# Patient Record
Sex: FEMALE | Race: WHITE | Employment: FULL TIME | URBAN - METROPOLITAN AREA
[De-identification: names, ages, dates, MRNs, and addresses within clinical notes are randomized per-mention and may not be internally consistent; named-entity substitution may affect disease eponyms.]

---

## 2017-06-21 RX ORDER — ROSUVASTATIN CALCIUM 20 MG/1
TABLET, COATED ORAL
Qty: 30 TABLET | Refills: 5 | Status: SHIPPED | OUTPATIENT
Start: 2017-06-21 | End: 2017-10-27

## 2017-09-25 ENCOUNTER — OFFICE VISIT (OUTPATIENT)
Dept: FAMILY MEDICINE CLINIC | Age: 47
End: 2017-09-25

## 2017-09-25 VITALS
OXYGEN SATURATION: 99 % | SYSTOLIC BLOOD PRESSURE: 142 MMHG | DIASTOLIC BLOOD PRESSURE: 74 MMHG | WEIGHT: 157 LBS | BODY MASS INDEX: 25.84 KG/M2 | HEART RATE: 90 BPM

## 2017-09-25 DIAGNOSIS — F32.1 MODERATE SINGLE CURRENT EPISODE OF MAJOR DEPRESSIVE DISORDER (HCC): Primary | ICD-10-CM

## 2017-09-25 PROCEDURE — 99214 OFFICE O/P EST MOD 30 MIN: CPT | Performed by: FAMILY MEDICINE

## 2017-09-25 RX ORDER — ESCITALOPRAM OXALATE 10 MG/1
TABLET ORAL
Qty: 30 TABLET | Refills: 5 | Status: SHIPPED | OUTPATIENT
Start: 2017-09-25 | End: 2018-03-26 | Stop reason: SDUPTHER

## 2017-09-26 ENCOUNTER — TELEPHONE (OUTPATIENT)
Dept: FAMILY MEDICINE CLINIC | Age: 47
End: 2017-09-26

## 2017-10-23 ENCOUNTER — OFFICE VISIT (OUTPATIENT)
Dept: FAMILY MEDICINE CLINIC | Age: 47
End: 2017-10-23

## 2017-10-23 VITALS
BODY MASS INDEX: 25.19 KG/M2 | WEIGHT: 153 LBS | SYSTOLIC BLOOD PRESSURE: 118 MMHG | HEART RATE: 77 BPM | DIASTOLIC BLOOD PRESSURE: 70 MMHG | OXYGEN SATURATION: 98 %

## 2017-10-23 DIAGNOSIS — E78.2 MIXED HYPERLIPIDEMIA: ICD-10-CM

## 2017-10-23 DIAGNOSIS — F32.A MODERATE DEPRESSIVE DISORDER: Primary | ICD-10-CM

## 2017-10-23 PROCEDURE — 99213 OFFICE O/P EST LOW 20 MIN: CPT | Performed by: FAMILY MEDICINE

## 2017-10-23 ASSESSMENT — ENCOUNTER SYMPTOMS
CHEST TIGHTNESS: 0
SHORTNESS OF BREATH: 0

## 2017-10-26 ENCOUNTER — NURSE ONLY (OUTPATIENT)
Dept: FAMILY MEDICINE CLINIC | Age: 47
End: 2017-10-26

## 2017-10-26 DIAGNOSIS — E78.2 MIXED HYPERLIPIDEMIA: Primary | ICD-10-CM

## 2017-10-26 DIAGNOSIS — F32.A MODERATE DEPRESSIVE DISORDER: ICD-10-CM

## 2017-10-26 DIAGNOSIS — E78.2 MIXED HYPERLIPIDEMIA: ICD-10-CM

## 2017-10-26 LAB
A/G RATIO: 2.1 (ref 1.1–2.2)
ALBUMIN SERPL-MCNC: 4.1 G/DL (ref 3.4–5)
ALP BLD-CCNC: 35 U/L (ref 40–129)
ALT SERPL-CCNC: 16 U/L (ref 10–40)
ANION GAP SERPL CALCULATED.3IONS-SCNC: 9 MMOL/L (ref 3–16)
AST SERPL-CCNC: 13 U/L (ref 15–37)
BILIRUB SERPL-MCNC: 0.5 MG/DL (ref 0–1)
BUN BLDV-MCNC: 12 MG/DL (ref 7–20)
CALCIUM SERPL-MCNC: 8.8 MG/DL (ref 8.3–10.6)
CHLORIDE BLD-SCNC: 100 MMOL/L (ref 99–110)
CHOLESTEROL, TOTAL: 291 MG/DL (ref 0–199)
CO2: 27 MMOL/L (ref 21–32)
CREAT SERPL-MCNC: 0.5 MG/DL (ref 0.6–1.1)
GFR AFRICAN AMERICAN: >60
GFR NON-AFRICAN AMERICAN: >60
GLOBULIN: 2 G/DL
GLUCOSE BLD-MCNC: 94 MG/DL (ref 70–99)
HDLC SERPL-MCNC: 65 MG/DL (ref 40–60)
LDL CHOLESTEROL CALCULATED: 208 MG/DL
POTASSIUM SERPL-SCNC: 4.3 MMOL/L (ref 3.5–5.1)
SODIUM BLD-SCNC: 136 MMOL/L (ref 136–145)
TOTAL PROTEIN: 6.1 G/DL (ref 6.4–8.2)
TRIGL SERPL-MCNC: 91 MG/DL (ref 0–150)
VLDLC SERPL CALC-MCNC: 18 MG/DL

## 2017-10-26 PROCEDURE — 36415 COLL VENOUS BLD VENIPUNCTURE: CPT | Performed by: FAMILY MEDICINE

## 2017-10-26 NOTE — PROGRESS NOTES
Patient came into the office per physician's request for the following blood test(s): lipid panel, comprehensive metabolic panel.       Blood drawn in office by     # of tubes sent:1 SST

## 2017-10-27 ENCOUNTER — TELEPHONE (OUTPATIENT)
Dept: FAMILY MEDICINE CLINIC | Age: 47
End: 2017-10-27

## 2017-10-27 DIAGNOSIS — R74.01 ALT (SGPT) LEVEL RAISED: Primary | ICD-10-CM

## 2017-10-27 RX ORDER — ROSUVASTATIN CALCIUM 40 MG/1
40 TABLET, COATED ORAL DAILY
Qty: 30 TABLET | Refills: 5 | Status: SHIPPED | OUTPATIENT
Start: 2017-10-27 | End: 2018-06-03 | Stop reason: SDUPTHER

## 2018-03-26 DIAGNOSIS — F32.1 MODERATE SINGLE CURRENT EPISODE OF MAJOR DEPRESSIVE DISORDER (HCC): ICD-10-CM

## 2018-03-26 RX ORDER — ESCITALOPRAM OXALATE 10 MG/1
TABLET ORAL
Qty: 28 TABLET | Refills: 4 | Status: SHIPPED | OUTPATIENT
Start: 2018-03-26 | End: 2018-09-11 | Stop reason: SDUPTHER

## 2018-05-25 DIAGNOSIS — J45.20 MILD INTERMITTENT ASTHMA WITHOUT COMPLICATION: ICD-10-CM

## 2018-06-04 RX ORDER — ROSUVASTATIN CALCIUM 40 MG/1
TABLET, COATED ORAL
Qty: 30 TABLET | Refills: 5 | Status: SHIPPED | OUTPATIENT
Start: 2018-06-04 | End: 2020-10-08

## 2018-08-13 ENCOUNTER — TELEPHONE (OUTPATIENT)
Dept: FAMILY MEDICINE CLINIC | Age: 48
End: 2018-08-13

## 2018-08-13 DIAGNOSIS — M79.645 THUMB PAIN, LEFT: Primary | ICD-10-CM

## 2018-09-06 ENCOUNTER — OFFICE VISIT (OUTPATIENT)
Dept: ORTHOPEDIC SURGERY | Age: 48
End: 2018-09-06

## 2018-09-06 VITALS — WEIGHT: 163.14 LBS | BODY MASS INDEX: 27.18 KG/M2 | HEIGHT: 65 IN

## 2018-09-06 DIAGNOSIS — M79.642 HAND PAIN, LEFT: Primary | ICD-10-CM

## 2018-09-06 DIAGNOSIS — R20.0 NUMBNESS IN BOTH HANDS: ICD-10-CM

## 2018-09-06 PROBLEM — M18.0 ARTHRITIS OF CARPOMETACARPAL (CMC) JOINT OF BOTH THUMBS: Status: ACTIVE | Noted: 2018-09-06

## 2018-09-06 PROCEDURE — L3918 METACARP FX ORTHOSIS PRE OTS: HCPCS | Performed by: ORTHOPAEDIC SURGERY

## 2018-09-06 PROCEDURE — 99243 OFF/OP CNSLTJ NEW/EST LOW 30: CPT | Performed by: ORTHOPAEDIC SURGERY

## 2018-09-06 RX ORDER — ESCITALOPRAM OXALATE 5 MG/1
5 TABLET ORAL DAILY
COMMUNITY
End: 2018-09-12

## 2018-09-06 NOTE — PROGRESS NOTES
HISTORY OF PRESENT ILLNESS:  The patient is a 62-year-old somewhat ambidextrous  who presents today for a new hand surgery specialty consultation at the request of Dr. Humphrey Doshi regarding both hands. Over the past couple of years she has had progressively worsening pain along the base of the left thumb which is exacerbated with firm gripping and forceful use when she is working in [employer]. However, over the last 6 months she started to have loss of strength with some numbness and tingling and some cramping feeling in the early mornings and at nighttime. She denies any neck pain. PAST MEDICAL HISTORY: Patient's medications, allergies, past medical, surgical, social and family histories were reviewed and updated as appropriate. ROS: Pertinent items are noted in HPI. Review of systems reviewed from patient history form dated on 9/6/18 and available in the patient's chart under the media tab. Denies fever, chills, confusion, bowel/bladder active change. PHYSICAL EXAMINATION: Examination reveals a pleasant individual in no acute distress. There appears to be satisfactory pain-free range of motion, strength, and stability of the cervical spine, shoulders, and elbows. Skin is intact without lymphadenopathy, discoloration, or abnormal temperature. There is intact, symmetric circulation in both upper extremities. Wrist, hand, and digital range of motion is satisfactory bilaterally. There is tenderness mostly along the base of the left thumb and a positive CMC grind consistent with left greater than right thumb CMC arthritis. Provocative testing for bilateral carpal tunnel syndrome suggests reproduction of symptoms with direct compression, Phalen's, and Tinel's. There is no sign of circulatory dysfunction or atrophy. DIAGNOSTIC TESTING: Three views of the left thumb reveal grade 2 degenerative change at the [facility] without obvious acute fracture.       IMPRESSION AND PLAN: I

## 2018-09-11 DIAGNOSIS — F32.1 MODERATE SINGLE CURRENT EPISODE OF MAJOR DEPRESSIVE DISORDER (HCC): ICD-10-CM

## 2018-09-11 NOTE — TELEPHONE ENCOUNTER
Looks like when pt saw Dr Hu Carpenter recently, she said she was taking lexapro 5 mg qd, which is fine, but our chart says 10 mg. If she is on 5 mg, we can change rx to matt 5 1qd 30+0. (or maybe 5 mg entry was a mistake).

## 2018-09-12 RX ORDER — ESCITALOPRAM OXALATE 10 MG/1
TABLET ORAL
Qty: 30 TABLET | Refills: 0 | Status: SHIPPED | OUTPATIENT
Start: 2018-09-12 | End: 2018-10-22 | Stop reason: SDUPTHER

## 2018-10-04 ENCOUNTER — OFFICE VISIT (OUTPATIENT)
Dept: ORTHOPEDIC SURGERY | Age: 48
End: 2018-10-04
Payer: COMMERCIAL

## 2018-10-04 DIAGNOSIS — M18.0 ARTHRITIS OF CARPOMETACARPAL (CMC) JOINT OF BOTH THUMBS: Primary | ICD-10-CM

## 2018-10-04 DIAGNOSIS — R20.0 NUMBNESS IN BOTH HANDS: ICD-10-CM

## 2018-10-04 DIAGNOSIS — G56.01 CARPAL TUNNEL SYNDROME OF RIGHT WRIST: ICD-10-CM

## 2018-10-04 DIAGNOSIS — G56.03 BILATERAL CARPAL TUNNEL SYNDROME: Primary | ICD-10-CM

## 2018-10-04 PROCEDURE — L3908 WHO COCK-UP NONMOLDE PRE OTS: HCPCS | Performed by: ORTHOPAEDIC SURGERY

## 2018-10-04 PROCEDURE — 95910 NRV CNDJ TEST 7-8 STUDIES: CPT | Performed by: PHYSICAL MEDICINE & REHABILITATION

## 2018-10-04 PROCEDURE — 99213 OFFICE O/P EST LOW 20 MIN: CPT | Performed by: ORTHOPAEDIC SURGERY

## 2018-10-04 PROCEDURE — 95886 MUSC TEST DONE W/N TEST COMP: CPT | Performed by: PHYSICAL MEDICINE & REHABILITATION

## 2018-10-04 NOTE — PROGRESS NOTES
[address] MEDICINE      Patient: Roque Shane Age: 49 Years 5 Months  Sex: Female Date: 10/4/2018  YOB: 1970 Ref. Phys.: Dr Jabari Villanueva  Notes:  r/o bilateral CTS      Sensory NCS      Nerve / Sites Peak PeakAmp Dist Pedro    ms µV cm m/s   L MEDIAN - D2 ULNAR D5   1. Median Wrist 3.10 33.0 14 56.0   2. Ulnar Wrist 3.25 11.8 14 58.3   R MEDIAN - D2 ULNAR D5   1. Median Wrist 3.70 24.6 14 45.9   2. Ulnar Wrist 3.15 21.9 14 57.1   L MEDIAN - RADIAL THUMB   1. Median Wrist       2. Radial Wrist 2.20 20.2 10 64.5   R MEDIAN - RADIAL THUMB   1. Median Wrist       2. Radial Wrist 2.35 25.0 10 52.6       Motor NCS      Nerve / Sites Lat Amp Amp Dist Pedro    ms mV % cm m/s   L MEDIAN - APB   1. Wrist 3.60 8.1 100 8    2. Elbow 7.45 8.5 105 23 59.7   R MEDIAN - APB   1. Wrist 3.70 6.4 100 8    2. Elbow 7.65 6.4 99.4 23 58.2   L ULNAR - ADM   1. Wrist 2.85 9.1 100 8    2. B. Elbow 5.55 9.8 108 19 70.4   3. A. Elbow 7.15 9.7 106 10 62.5   R ULNAR - ADM   1. Wrist 2.80 10.1 100 8    2. B. Elbow 5.50 10.0 98.5 20 74.1   3. A. Elbow 7.20 9.3 91.7 10 58.8       EMG Summary Table     Spontaneous MUAP Recruit. Ins. Act Fibs. PSW Fasics. H.F. Amp. Dur. Poly's. Pattern   L. FIRST D INTEROSS N None None None None N N N N   R. ABD POLL BREVIS N None None None None N N N N   R. BICEPS N None None None None N N N N   R. TRICEPS N None None None None N N N N   R. EXT CARPI R BREV N None None None None N N N N   R. EXT DIG COMM N None None None None N N N N   R. PRON TERES N None None None None N N N N   R. FIRST D INTEROSS N None None None None N N N N   L. BICEPS N None None None None N N N N   L. TRICEPS N None None None None N N N N   L. EXT CARPI R BREV N None None None None N N N N   L. EXT DIG COMM N None None None None N N N N   L.  PRON TERES N None None None None N N N N       Summary: Right median SNAP latency is mildy slowed across the wrist.  The remainder of the

## 2018-10-05 ENCOUNTER — HOSPITAL ENCOUNTER (OUTPATIENT)
Dept: OCCUPATIONAL THERAPY | Age: 48
Setting detail: THERAPIES SERIES
Discharge: HOME | End: 2018-10-05
Payer: COMMERCIAL

## 2018-10-05 PROCEDURE — G8984 CARRY CURRENT STATUS: HCPCS | Performed by: OCCUPATIONAL THERAPIST

## 2018-10-05 PROCEDURE — G0283 ELEC STIM OTHER THAN WOUND: HCPCS | Performed by: OCCUPATIONAL THERAPIST

## 2018-10-05 PROCEDURE — 97165 OT EVAL LOW COMPLEX 30 MIN: CPT | Performed by: OCCUPATIONAL THERAPIST

## 2018-10-05 PROCEDURE — L3913 HFO W/O JOINTS CF: HCPCS | Performed by: OCCUPATIONAL THERAPIST

## 2018-10-05 PROCEDURE — 97018 PARAFFIN BATH THERAPY: CPT | Performed by: OCCUPATIONAL THERAPIST

## 2018-10-05 PROCEDURE — 97110 THERAPEUTIC EXERCISES: CPT | Performed by: OCCUPATIONAL THERAPIST

## 2018-10-05 PROCEDURE — G8985 CARRY GOAL STATUS: HCPCS | Performed by: OCCUPATIONAL THERAPIST

## 2018-10-05 NOTE — FLOWSHEET NOTE
[facility], [address] [place] [address] Adrián  Phone: [phone]  Fax [phone]  Hand Therapy Daily Treatment Note  Date:  10/5/2018    Patient: Phyllis Jacinto   : 1970   MRN: [id number]  Referring Physician: Referring Practitioner: Dr. Adán Reyes Diagnosis Information:   Diagnosis: L thumb CMC OA, B CTS Treatment dx: B hand pain. M25.541 M25.542        Date of injury: progressive onset   Date of Surgery/Type of procedure: na                                     Insurance information:  anthem   Comorbidities Affecting Functional Performance:     []Anxiety (F41.9)/Depression (F32.9)   []Diabetes Type 1(E10.65) or 2 (E11.65)   []Rheumatoid Arthritis (M05.9)  []Fibromyalgia (M79.7)  []Neuropathy(G60.9)  [x]Osteoarthritis(M19.91)  []None   []Other:    G-code: OT G-codes  Functional Assessment Tool Used: quick dash   Score: 23%  Functional Limitation: Carrying, moving and handling objects  Carrying, Moving and Handling Objects Current Status (): At least 20 percent but less than 40 percent impaired, limited or restricted  Carrying, Moving and Handling Objects Goal Status ():  At least 20 percent but less than 40 percent impaired, limited or restricted    Date of Patient follow up with Physician:  Preferred Language for Healthcare:   [x]English       []other:  Latex Allergy:  [x]NO      []YES  RESTRICTIONS/PRECAUTIONS:   CMC splint L hand wrist cock up at night B  Progress Note: [x]  Yes  []  No  Next due by : Visit #10       Visit # Insurance Allowable Requires [facility]   1     no:[]                  yes:[]    From 10/5/18 to 10/5/2018    Pain level:  5/10 R hand 2-3/10 L hand      SUBJECTIVE:  See eval       OBJECTIVE:   Date:  Hand Dominance:     [x]  Right    [] Left 10/5/2018      Objective Measures:     PAIN 5/10 R 2-3/10 L   Quick DASH 23%                           Digits tip to DPFC in cm score of 20% or less on the Quick DASH disability questionnaire for increased performance with carrying, moving, and handling objects. 3) Pt will demonstrate increased ROM to R wrist to 70/60 for improved grooming tasks  4) Pt will demonstrate increased strength to L  to 35# for improved ability to open things   5) Pt will have a decrease in pain to 0-2/10 in B hands to     Progression Towards Functional goals:  [] Patient is progressing as expected towards functional goals listed. [] Progression is slowed due to complexities listed. [] Progression has been slowed due to co-morbidities. [x] Plan just implemented, too soon to assess goals progression  [] Other:     ASSESSMENT:    Treatment/Activity Tolerance:  [x] Patient tolerated treatment well [] Patient limited by fatique  [] Patient limited by pain  [] Patient limited by other medical complications  [] Other:     Prognosis: [x] Good [] Fair  [] Poor    Patient Requires Follow-up: [x] Yes  [] No    PLAN: Recommend Occupational Therapy 2 times a week for 6 weeks  [] Continue per plan of care [] Alter current plan (see comments)  [x] Plan of care initiated [] Hold pending MD visit [] Discharge    Plan for next session:     Thermal modalities, functional activities and strengthening as inicated, AROM, PROM as indicated    Electronically signed by: Enzo Toussaint OTR/L [id number]

## 2018-10-09 ENCOUNTER — APPOINTMENT (OUTPATIENT)
Dept: OCCUPATIONAL THERAPY | Age: 48
End: 2018-10-09
Payer: COMMERCIAL

## 2018-10-11 ENCOUNTER — HOSPITAL ENCOUNTER (OUTPATIENT)
Dept: OCCUPATIONAL THERAPY | Age: 48
Setting detail: THERAPIES SERIES
Discharge: HOME | End: 2018-10-11
Payer: COMMERCIAL

## 2018-10-11 PROCEDURE — 97110 THERAPEUTIC EXERCISES: CPT | Performed by: OCCUPATIONAL THERAPIST

## 2018-10-11 PROCEDURE — 97018 PARAFFIN BATH THERAPY: CPT | Performed by: OCCUPATIONAL THERAPIST

## 2018-10-11 PROCEDURE — 97140 MANUAL THERAPY 1/> REGIONS: CPT | Performed by: OCCUPATIONAL THERAPIST

## 2018-10-11 PROCEDURE — 97035 APP MDLTY 1+ULTRASOUND EA 15: CPT | Performed by: OCCUPATIONAL THERAPIST

## 2018-10-18 ENCOUNTER — HOSPITAL ENCOUNTER (OUTPATIENT)
Dept: OCCUPATIONAL THERAPY | Age: 48
Setting detail: THERAPIES SERIES
Discharge: HOME | End: 2018-10-18
Payer: COMMERCIAL

## 2018-10-18 PROCEDURE — 97140 MANUAL THERAPY 1/> REGIONS: CPT | Performed by: OCCUPATIONAL THERAPIST

## 2018-10-18 PROCEDURE — G0283 ELEC STIM OTHER THAN WOUND: HCPCS | Performed by: OCCUPATIONAL THERAPIST

## 2018-10-18 PROCEDURE — 97110 THERAPEUTIC EXERCISES: CPT | Performed by: OCCUPATIONAL THERAPIST

## 2018-10-18 PROCEDURE — 97035 APP MDLTY 1+ULTRASOUND EA 15: CPT | Performed by: OCCUPATIONAL THERAPIST

## 2018-10-18 PROCEDURE — 97018 PARAFFIN BATH THERAPY: CPT | Performed by: OCCUPATIONAL THERAPIST

## 2018-10-18 NOTE — FLOWSHEET NOTE
PAIN 5/10 R 2-3/10 L    Quick DASH 23%                                Digits tip to DPFC in cm Index:  Long:  Ring:  Small:    Thumb ROM    R: 51/55     L:MP47  IP 58    Thumb opposition  R:  L:    Thumb Radial/Palmar abd ROM R:  L:    Wrist ROM Ext/Flex R: 65/55  L: 80/66    Rad/Uln dev ROM R:  L:                  Shoulder Flex  Shoulder Abd  Shoulder IR/ER      Edema in cm circumf. MCPJs R:  L:    Edema in cm circumf. Wrist R:  L:     strength in lbs R: 15.0  L: 14.5    Pinch Strengthin lbs:   R: 45#  L: 25#     Limited secondary to thumb pain  R: 45#  L: 36#   Pinch Strength in lbs:  Lat pinch  R: 15#  L: 7#     (5/10 pain with pinch )    R: 13#  L: 8#   MMT:              Modalities: 10/5/18   10/11/18  10/18/18    HP and para L hand CP and IFC R hand  X  HP and para L hand HP and para L hand and IFC and CP R hand      US x 8' 50% pulsed B hand       CP R hand after tx  Same    Therapeutic Exercise & Activities:      AROM/PROM wrist and thumb  13' with cues  5'  5'   Power web   Red x 10 B Red x 10    Flex bar  Education and precautions including joint protection  Wrist ext x 20 B Wrist ext x 20    Roll and  putty   5' B  5' B hands   PROM wrist    Self stretch                                    Therapeutic Exercise and NMR EXR  [x] (46297) Provided verbal/tactile cueing for activities related to strengthening, flexibility, endurance, ROM  for improvements in scapular, scapulothoracic and UE control with self care, reaching, carrying, lifting, house/yardwork, driving/computer work.    [] (44558) Provided verbal/tactile cueing for activities related to improving balance, coordination, kinesthetic sense, posture, motor skill, proprioception  to assist with  scapular, scapulothoracic and UE control with self care, reaching, carrying, lifting, house/yardwork, driving/computer work. Therapeutic Activities:    [] ( 54808) therapeutic activities, direct (one on one) patient contact.  Use of dynamic

## 2018-10-25 ENCOUNTER — HOSPITAL ENCOUNTER (OUTPATIENT)
Dept: OCCUPATIONAL THERAPY | Age: 48
Setting detail: THERAPIES SERIES
Discharge: HOME | End: 2018-10-25
Payer: COMMERCIAL

## 2018-10-25 PROCEDURE — 97018 PARAFFIN BATH THERAPY: CPT | Performed by: OCCUPATIONAL THERAPIST

## 2018-10-25 PROCEDURE — G0283 ELEC STIM OTHER THAN WOUND: HCPCS | Performed by: OCCUPATIONAL THERAPIST

## 2018-10-25 PROCEDURE — 97110 THERAPEUTIC EXERCISES: CPT | Performed by: OCCUPATIONAL THERAPIST

## 2018-10-25 PROCEDURE — 97140 MANUAL THERAPY 1/> REGIONS: CPT | Performed by: OCCUPATIONAL THERAPIST

## 2018-10-25 NOTE — FLOWSHEET NOTE
Activities:    [] ( 67993) therapeutic activities, direct (one on one) patient contact. Use of dynamic activities to improve functional performance.     Activities of Daily Living:  [] (24263) Provided self-care/home management training (i.e., activities of daily living and compensatory training, meal preparation, safety procedures, and instructions in use of assistive technology devices/adaptive equipment)     Home Exercise Program: issued HEP as seen in media file 10/5/18     [x] [phone]) Reviewed/Progressed HEP activities related to strengthening, flexibility, endurance, ROM of scapular, scapulothoracic and UE control with self care, reaching, carrying, lifting, house/yardwork, driving/computer work    Manual Treatments: DTM and trigger point release in thenars B hands 15'  [x] (19436) Provided manual therapy to mobilize soft tissue/joints of the UE for the purpose of modulating pain, promoting relaxation,  increasing ROM, reducing/eliminating soft tissue swelling/inflammation/restriction, improving soft tissue extensibility and allowing for proper ROM for normal function with self care, reaching, carrying, lifting, house/yardwork, driving/computer work    Splinting:  [] Fabrication of: L-code  Hand based thumb spica [id number] 10/5/18   [] (57857) Checkout for orthotic/prosthetic use, established patient   [] (02929) Orthotic management and training (fitting and assessment)  [] Comments:    Charges:  Timed Code Treatment Minutes: 30   Total Treatment Minutes: 55   [] EVAL (LOW) 11239   [] OT Re-eval (71431)  [] EVAL (MOD) 35330   [] EVAL (HIGH) 28874       [x] Simran (02898) x      [] KUROA(59828)  [] NMR (03459) x      [] Estim (attended) (97633)   [x] Manual ([id number]) x       [] US (10416)  [] TA (14422) x      [x] Paraffin (93323)  [] ADL  (13918) x     [] Splint/L code: as above    [x] Estim (unattended) (97827)  [] Other:  [] Fluidotherapy (30521)  [](26392) Checkout for orthotic use, established patient x

## 2018-11-01 ENCOUNTER — HOSPITAL ENCOUNTER (OUTPATIENT)
Dept: OCCUPATIONAL THERAPY | Age: 48
Setting detail: THERAPIES SERIES
Discharge: HOME | End: 2018-11-01
Payer: COMMERCIAL

## 2018-11-01 PROCEDURE — 97140 MANUAL THERAPY 1/> REGIONS: CPT | Performed by: OCCUPATIONAL THERAPIST

## 2018-11-01 PROCEDURE — G0283 ELEC STIM OTHER THAN WOUND: HCPCS | Performed by: OCCUPATIONAL THERAPIST

## 2018-11-01 PROCEDURE — 97018 PARAFFIN BATH THERAPY: CPT | Performed by: OCCUPATIONAL THERAPIST

## 2018-11-01 PROCEDURE — 97110 THERAPEUTIC EXERCISES: CPT | Performed by: OCCUPATIONAL THERAPIST

## 2018-11-01 NOTE — FLOWSHEET NOTE
Quick DASH 23%                                          Digits tip to DPFC in cm Index:  Long:  Ring:  Small:      Thumb ROM    R: 51/55     L:MP47  IP 58      Thumb opposition  R:  L:      Thumb Radial/Palmar abd ROM R:  L:      Wrist ROM Ext/Flex R: 65/55  L: 80/66      Rad/Uln dev ROM R:  L:                        Shoulder Flex  Shoulder Abd  Shoulder IR/ER        Edema in cm circumf. MCPJs R:  L:      Edema in cm circumf.   Wrist R:  L:       strength in lbs R: 15.0  L: 14.5      Pinch Strengthin lbs:   R: 45#  L: 25#     Limited secondary to thumb pain  R: 45#  L: 36# R: 41#  L: 36# R: 60#  L: 60#   Pinch Strength in lbs:  Lat pinch  R: 15#  L: 7#     (5/10 pain with pinch )    R: 13#  L: 8#     MMT:                Modalities: 10/5/18   10/11/18  10/18/18  10/25/18  11/1/18    HP and para L hand CP and IFC R hand  X  HP and para L hand HP and para L hand and IFC and CP R hand  Same  Same      US x 8' 50% pulsed B hand       CP R hand after tx  Same      Therapeutic Exercise & Activities:        AROM/PROM wrist and thumb  13' with cues  5'  5'  5'   Power web   Red x 10 B Red x 10  B x 10  X 10 B    Flex bar  Education and precautions including joint protection  Wrist ext x 20 B Wrist ext x 20  Wrist flex ext red x 20 each  Red x 20    Roll and  putty   5' B  5' B hands 5' B  5' B    PROM wrist    Self stretch  Prayer stretch x 10  X 10                                              Therapeutic Exercise and NMR EXR  [x] (42485) Provided verbal/tactile cueing for activities related to strengthening, flexibility, endurance, ROM  for improvements in scapular, scapulothoracic and UE control with self care, reaching, carrying, lifting, house/yardwork, driving/computer work.    [] (02502) Provided verbal/tactile cueing for activities related to improving balance, coordination, kinesthetic sense, posture, motor skill, proprioception  to assist with  scapular, scapulothoracic and UE control with self care,

## 2019-03-22 ENCOUNTER — OFFICE VISIT (OUTPATIENT)
Dept: FAMILY MEDICINE CLINIC | Age: 49
End: 2019-03-22
Payer: COMMERCIAL

## 2019-03-22 VITALS
BODY MASS INDEX: 27.96 KG/M2 | OXYGEN SATURATION: 98 % | HEART RATE: 98 BPM | DIASTOLIC BLOOD PRESSURE: 72 MMHG | WEIGHT: 168 LBS | TEMPERATURE: 100.4 F | SYSTOLIC BLOOD PRESSURE: 158 MMHG

## 2019-03-22 DIAGNOSIS — R50.9 FEVER, UNSPECIFIED FEVER CAUSE: ICD-10-CM

## 2019-03-22 DIAGNOSIS — R05.9 COUGH: Primary | ICD-10-CM

## 2019-03-22 DIAGNOSIS — J10.1 INFLUENZA A: ICD-10-CM

## 2019-03-22 LAB
INFLUENZA A ANTIBODY: POSITIVE
INFLUENZA B ANTIBODY: ABNORMAL

## 2019-03-22 PROCEDURE — 99213 OFFICE O/P EST LOW 20 MIN: CPT | Performed by: NURSE PRACTITIONER

## 2019-03-22 PROCEDURE — 87804 INFLUENZA ASSAY W/OPTIC: CPT | Performed by: NURSE PRACTITIONER

## 2019-03-22 ASSESSMENT — PATIENT HEALTH QUESTIONNAIRE - PHQ9
SUM OF ALL RESPONSES TO PHQ9 QUESTIONS 1 & 2: 0
1. LITTLE INTEREST OR PLEASURE IN DOING THINGS: 0
SUM OF ALL RESPONSES TO PHQ QUESTIONS 1-9: 0
2. FEELING DOWN, DEPRESSED OR HOPELESS: 0
SUM OF ALL RESPONSES TO PHQ QUESTIONS 1-9: 0

## 2019-03-22 ASSESSMENT — ENCOUNTER SYMPTOMS
COUGH: 1
SHORTNESS OF BREATH: 1
SORE THROAT: 0
WHEEZING: 1

## 2020-10-08 ENCOUNTER — OFFICE VISIT (OUTPATIENT)
Dept: FAMILY MEDICINE CLINIC | Age: 50
End: 2020-10-08
Payer: COMMERCIAL

## 2020-10-08 VITALS
HEART RATE: 75 BPM | WEIGHT: 169 LBS | DIASTOLIC BLOOD PRESSURE: 70 MMHG | BODY MASS INDEX: 28.12 KG/M2 | TEMPERATURE: 97.1 F | OXYGEN SATURATION: 98 % | SYSTOLIC BLOOD PRESSURE: 126 MMHG

## 2020-10-08 PROCEDURE — 99214 OFFICE O/P EST MOD 30 MIN: CPT | Performed by: FAMILY MEDICINE

## 2020-10-08 RX ORDER — TRAZODONE HYDROCHLORIDE 50 MG/1
TABLET ORAL
Qty: 60 TABLET | Refills: 5 | Status: SHIPPED | OUTPATIENT
Start: 2020-10-08 | End: 2021-01-28

## 2020-10-08 ASSESSMENT — PATIENT HEALTH QUESTIONNAIRE - PHQ9
SUM OF ALL RESPONSES TO PHQ QUESTIONS 1-9: 0
2. FEELING DOWN, DEPRESSED OR HOPELESS: 0
1. LITTLE INTEREST OR PLEASURE IN DOING THINGS: 0
SUM OF ALL RESPONSES TO PHQ9 QUESTIONS 1 & 2: 0
SUM OF ALL RESPONSES TO PHQ QUESTIONS 1-9: 0

## 2020-10-08 NOTE — LETTER
[address]  Phone: [phone]  Fax: [phone]    Poli Conrad MD        October 8, 2020     Patient: Wilmar Ochoa   YOB: 1970   Date of Visit: 10/8/2020       To Whom It May Concern:    Please note that Wilmar Ochoa has been diagnosed with bilateral carpel tunnel syndrome and thumb arthritis. These diagnoses are made from both clinical and diagnostic EMG testing. She has tried splints, oral antiinflammatories and occupational therapy without improvement. Please consider these diagnoses when adjusting her job duties. If you have any questions or concerns, please don't hesitate to call.     Sincerely,        Poli Conrad MD

## 2020-10-08 NOTE — PROGRESS NOTES
Patient: Nia Figueroa is a 47 y. o.female who presents today with the following Chief Complaint(s):  Chief Complaint   Patient presents with    Insomnia     Going up to 48 hrs without sleep. HPI: Pt with hld, depression, asthma has long hx of prim and middle insomnia. May lie in bed x hrs w/o sleep. Sometimes sleeps only 2 hr per noc. Feels achy 2/2 poor sleep. Middle insom is only sometimes related to CTS pain awakening her. Has not been told she snores. Takes 10 min cat naps at times. Conts to work 2 jobs, [employer] and [employer], 4a-1p and 5p to 11p. Is off Mon, Wed pm and Thurs. Tries to read, relax, rest w/o change in insomnia. Feels emotionally healthy. Admits she has emotional stress but reading self help books and working helps to keep her calm and stable. Describes herself as having temper. Feels impulsive since childhood. Feels she recognizes this feeling and is able to control it. Has 2nd cousin once removed with schizophrenia but no hx of bpd. Denies [place]. Was dx'd with R>L CTS 2018 via exam. EMG per Dr Jayme Lockwood was pos on R. Pt was referred to OT, improved but had to stop 2/2 cost beyond insurance. 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 Does not want surgery. Hands tingle at hs. Splints at hs no longer helpful. Not interested in injection as she knows it is temporary fix. Has gotten away from lexapro and crestor. Agrees to return for PEx. Will have fasting labs at [facility] soon for insur. Current Outpatient Medications   Medication Sig Dispense Refill    TURMERIC PO Take by mouth      traZODone (DESYREL) 50 MG tablet 1-2 po qhs for sleep 60 tablet 5    VENTOLIN  (90 Base) MCG/ACT inhaler INHALE TWO PUFFS BY MOUTH EVERY 6 HOURS AS NEEDED FOR WHEEZING 3 Inhaler 3    Cholecalciferol (VITAMIN D) 2000 UNITS CAPS capsule Take 2 capsules by mouth      B Complex Vitamins (VITAMIN B COMPLEX PO) Take  by mouth.         Multiple Vitamin (MULTIVITAMINS PO) Take 1 tablet by mouth daily. No current facility-administered medications for this visit. Patient's past medical history,surgical history, family history, medications,  and allergies  were all reviewed and updated as appropriate today. Review of Systems   Constitutional: Positive for fatigue. Negative for activity change and appetite change. Musculoskeletal: Positive for arthralgias. Psychiatric/Behavioral: Positive for sleep disturbance. Negative for dysphoric mood. The patient is not nervous/anxious. Physical Exam  Constitutional:       Appearance: Normal appearance. She is well-developed. HENT:      Head: Normocephalic and atraumatic. Right Ear: External ear normal.      Left Ear: External ear normal.   Eyes:      General: No scleral icterus. Right eye: No discharge. Left eye: No discharge. Extraocular Movements: Extraocular movements intact. Conjunctiva/sclera: Conjunctivae normal.   Neck:      Comments: No visualized masses  FROM  Pulmonary:      Effort: Pulmonary effort is normal.   Musculoskeletal: Normal range of motion. Skin:     General: Skin is warm and dry. Neurological:      General: No focal deficit present. Mental Status: She is alert and oriented to person, place, and time. Psychiatric:         Mood and Affect: Mood normal.         Behavior: Behavior normal.         Thought Content: Thought content normal.         Judgment: Judgment normal.           /70   Pulse 75   Temp 97.1 °F (36.2 °C) (Temporal)   Wt 169 lb (76.7 kg)   SpO2 98%   BMI 28.12 kg/m²     Assessment/Plan:    Ricarda Parada was seen today for insomnia.     Diagnoses and all orders for this visit:    Chronic insomnia  -     Sheeba Barnett MD, Pulmonary, [facility]  -     traZODone (DESYREL) 50 MG tablet; 1-2 po qhs for sleep    Bilateral carpal tunnel syndrome, Osteoarthritis of both thumbs   See letter written for work    Billing on basis of time. Pt was seen for 30 min, more than 50% spent in counseling.

## 2021-01-28 ENCOUNTER — OFFICE VISIT (OUTPATIENT)
Dept: FAMILY MEDICINE CLINIC | Age: 51
End: 2021-01-28
Payer: COMMERCIAL

## 2021-01-28 VITALS
HEIGHT: 64 IN | OXYGEN SATURATION: 98 % | TEMPERATURE: 96.9 F | HEART RATE: 68 BPM | WEIGHT: 169 LBS | DIASTOLIC BLOOD PRESSURE: 78 MMHG | SYSTOLIC BLOOD PRESSURE: 136 MMHG | BODY MASS INDEX: 28.85 KG/M2

## 2021-01-28 DIAGNOSIS — E78.2 MIXED HYPERLIPIDEMIA: ICD-10-CM

## 2021-01-28 DIAGNOSIS — F51.04 CHRONIC INSOMNIA: ICD-10-CM

## 2021-01-28 DIAGNOSIS — M18.0 OSTEOARTHRITIS OF BOTH THUMBS: ICD-10-CM

## 2021-01-28 DIAGNOSIS — Z00.00 ANNUAL PHYSICAL EXAM: Primary | ICD-10-CM

## 2021-01-28 DIAGNOSIS — G56.01 CARPAL TUNNEL SYNDROME OF RIGHT WRIST: ICD-10-CM

## 2021-01-28 PROCEDURE — 99396 PREV VISIT EST AGE 40-64: CPT | Performed by: FAMILY MEDICINE

## 2021-01-28 ASSESSMENT — PATIENT HEALTH QUESTIONNAIRE - PHQ9: SUM OF ALL RESPONSES TO PHQ9 QUESTIONS 1 & 2: 0

## 2021-01-28 NOTE — PROGRESS NOTES
Patient: Constantino Houston is a 45 y. o.female who presents today with the following Chief Complaint(s):  Chief Complaint   Patient presents with    Annual Exam         HPI: Pt with hld, depression, asthma, chronic insomnia, thumb OA,  was referred to Dr Chris Desai 10/2020 and Arabella Choudhury was rx'd. However, with job duty change, has been sleeping well and is feeling better. Read LEXI of traz and had concerns, did not take, does not need. 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 Hand numbness is much improved and sleep is much better. Mood is much improved, feels happy, less stressed. 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 Had fasting labs for biometric screen at work 10/2020. , Trig 107, HDL 79, . Started Autophagy (cell repair) diet 1 wk ago that involves 48 hr fast every wk x 4wk. Subsequent duration of fasting lessens. Can drink water and herbal tea during 48 hr fast. On other 5 days, eats for 6 hr, fasts for 18 hr. Plans to do for 1 mo. F and PGM have hld. Current Outpatient Medications   Medication Sig Dispense Refill    TURMERIC PO Take by mouth      VENTOLIN  (90 Base) MCG/ACT inhaler INHALE TWO PUFFS BY MOUTH EVERY 6 HOURS AS NEEDED FOR WHEEZING 3 Inhaler 3    Cholecalciferol (VITAMIN D) 2000 UNITS CAPS capsule Take 2 capsules by mouth      B Complex Vitamins (VITAMIN B COMPLEX PO) Take  by mouth.  Multiple Vitamin (MULTIVITAMINS PO) Take 1 tablet by mouth daily. No current facility-administered medications for this visit. Patient's past medical history,surgical history, family history, medications,  and allergies  were all reviewed and updated as appropriate today. Review of Systems  As abv    Physical Exam  Constitutional:       General: She is not in acute distress. Appearance: Normal appearance. She is well-developed. She is not ill-appearing.    HENT: Diagnoses and all orders for this visit:    Annual physical exam  -     Lipid Panel; Future  -     Comprehensive Metabolic Panel; Future  -     Hepatitis C Antibody; Future  -     POCT Fecal Immunochemical Test (FIT); Future   Pt declines HM vaccines    Mixed hyperlipidemia as per biometric screen  -     Lipid Panel; Future before 3 mo appt. Pt will follow intermittent fasting plan as above but will gradually move toward eating for 8 hr/day and fasting for 16 hr/day. Concern that current plan is very restrictive. 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

## 2021-04-22 ENCOUNTER — HOSPITAL ENCOUNTER (OUTPATIENT)
Age: 51
Discharge: HOME | End: 2021-04-22
Payer: COMMERCIAL

## 2021-04-22 DIAGNOSIS — Z00.00 ANNUAL PHYSICAL EXAM: ICD-10-CM

## 2021-04-22 DIAGNOSIS — E78.2 MIXED HYPERLIPIDEMIA: ICD-10-CM

## 2021-04-22 LAB
A/G RATIO: 1.6 (ref 1.1–2.2)
ALBUMIN SERPL-MCNC: 4.1 G/DL (ref 3.4–5)
ALP BLD-CCNC: 32 U/L (ref 40–129)
ALT SERPL-CCNC: 11 U/L (ref 10–40)
ANION GAP SERPL CALCULATED.3IONS-SCNC: 9 MMOL/L (ref 3–16)
AST SERPL-CCNC: 11 U/L (ref 15–37)
BILIRUB SERPL-MCNC: 0.7 MG/DL (ref 0–1)
BUN BLDV-MCNC: 11 MG/DL (ref 7–20)
CALCIUM SERPL-MCNC: 8.9 MG/DL (ref 8.3–10.6)
CHLORIDE BLD-SCNC: 105 MMOL/L (ref 99–110)
CHOLESTEROL, TOTAL: 326 MG/DL (ref 0–199)
CO2: 24 MMOL/L (ref 21–32)
CREAT SERPL-MCNC: <0.5 MG/DL (ref 0.6–1.1)
GFR AFRICAN AMERICAN: >60
GFR NON-AFRICAN AMERICAN: >60
GLOBULIN: 2.5 G/DL
GLUCOSE BLD-MCNC: 91 MG/DL (ref 70–99)
HDLC SERPL-MCNC: 69 MG/DL (ref 40–60)
HEPATITIS C ANTIBODY INTERPRETATION: NORMAL
LDL CHOLESTEROL CALCULATED: 236 MG/DL
POTASSIUM SERPL-SCNC: 4.2 MMOL/L (ref 3.5–5.1)
SODIUM BLD-SCNC: 138 MMOL/L (ref 136–145)
TOTAL PROTEIN: 6.6 G/DL (ref 6.4–8.2)
TRIGL SERPL-MCNC: 105 MG/DL (ref 0–150)
VLDLC SERPL CALC-MCNC: 21 MG/DL

## 2021-04-22 PROCEDURE — 80053 COMPREHEN METABOLIC PANEL: CPT

## 2021-04-22 PROCEDURE — 36415 COLL VENOUS BLD VENIPUNCTURE: CPT

## 2021-04-22 PROCEDURE — 80061 LIPID PANEL: CPT

## 2021-04-22 PROCEDURE — 86803 HEPATITIS C AB TEST: CPT

## 2021-04-22 RX ORDER — ROSUVASTATIN CALCIUM 5 MG/1
5 TABLET, COATED ORAL NIGHTLY
Qty: 30 TABLET | Refills: 5 | Status: SHIPPED | OUTPATIENT
Start: 2021-04-22 | End: 2022-05-02

## 2021-04-26 ENCOUNTER — OFFICE VISIT (OUTPATIENT)
Dept: FAMILY MEDICINE CLINIC | Age: 51
End: 2021-04-26
Payer: COMMERCIAL

## 2021-04-26 VITALS
SYSTOLIC BLOOD PRESSURE: 110 MMHG | HEART RATE: 71 BPM | TEMPERATURE: 98.1 F | OXYGEN SATURATION: 98 % | DIASTOLIC BLOOD PRESSURE: 60 MMHG | WEIGHT: 161 LBS | BODY MASS INDEX: 27.64 KG/M2

## 2021-04-26 DIAGNOSIS — E78.2 MIXED HYPERLIPIDEMIA: Primary | ICD-10-CM

## 2021-04-26 DIAGNOSIS — K12.0 APHTHOUS STOMATITIS: ICD-10-CM

## 2021-04-26 PROCEDURE — 99213 OFFICE O/P EST LOW 20 MIN: CPT | Performed by: FAMILY MEDICINE

## 2021-04-26 RX ORDER — TRIAMCINOLONE ACETONIDE 0.1 %
PASTE (GRAM) DENTAL
Qty: 1 TUBE | Refills: 2 | Status: SHIPPED | OUTPATIENT
Start: 2021-04-26 | End: 2021-05-03

## 2021-04-26 ASSESSMENT — PATIENT HEALTH QUESTIONNAIRE - PHQ9
1. LITTLE INTEREST OR PLEASURE IN DOING THINGS: 0
SUM OF ALL RESPONSES TO PHQ9 QUESTIONS 1 & 2: 0
SUM OF ALL RESPONSES TO PHQ QUESTIONS 1-9: 0

## 2021-04-26 NOTE — PROGRESS NOTES
Assessment/Plan:    Kenny Resedniz was seen today for follow-up. Diagnoses and all orders for this visit:    Mixed hyperlipidemia  -     Lipid, Fasting; Future in 3-6 mo. Cont crestor 5 and healthy diet, exercise. Aphthous stomatitis  -     triamcinolone acetonide (KENALOG) 0.1 % paste; Apply to mouth sores bid for up to 7 days prn mouth sores, rf              Patient: Nishant Christian is a 45 y. o.female who presents today with the following Chief Complaint(s):  Chief Complaint   Patient presents with    Follow-up     Chol. HPI: Pt with hld, depression, asthma, chronic insomnia, thumb OA has lost 8 lb in past 3 mo. Is active, plans to start running program with her son. Has been intermittent fasting x 16 hr/day. 4/26/21 chol labs: 326/105/69/236. Despite wt loss and low fat, low carb diet, chol increased. Pt started crestor 5 mg x past 4 days, tolerating w/o LEXI. Saw Dr Anibal Echols 2015 for recurrent apthous stomatitis. Was rx'd triamcin past 0.1%, helpful. Pt is willing to do FIT though office is out of kits. Pt is not ready to proceed with mammography. Current Outpatient Medications   Medication Sig Dispense Refill    triamcinolone acetonide (KENALOG) 0.1 % paste Apply to mouth sores bid for up to 7 days prn mouth sores 1 Tube 2    rosuvastatin (CRESTOR) 5 MG tablet Take 1 tablet by mouth nightly 30 tablet 5    TURMERIC PO Take by mouth      VENTOLIN  (90 Base) MCG/ACT inhaler INHALE TWO PUFFS BY MOUTH EVERY 6 HOURS AS NEEDED FOR WHEEZING 3 Inhaler 3    Cholecalciferol (VITAMIN D) 2000 UNITS CAPS capsule Take 2 capsules by mouth      B Complex Vitamins (VITAMIN B COMPLEX PO) Take  by mouth.  Multiple Vitamin (MULTIVITAMINS PO) Take 1 tablet by mouth daily. No current facility-administered medications for this visit.         Patient's past medical history,surgical history, family history, medications,  and allergies  were all reviewed and updated as appropriate today.    Review of Systems  abv    Physical Exam  Constitutional:       Appearance: Normal appearance. She is well-developed. HENT:      Head: Normocephalic and atraumatic. Right Ear: External ear normal.      Left Ear: External ear normal.   Eyes:      General: No scleral icterus. Right eye: No discharge. Left eye: No discharge. Extraocular Movements: Extraocular movements intact. Conjunctiva/sclera: Conjunctivae normal.   Neck:      Comments: No visualized masses  FROM  Pulmonary:      Effort: Pulmonary effort is normal.   Musculoskeletal: Normal range of motion. Skin:     General: Skin is warm and dry. Neurological:      General: No focal deficit present. Mental Status: She is alert and oriented to person, place, and time. Psychiatric:         Mood and Affect: Mood normal.         Behavior: Behavior normal.         Thought Content:  Thought content normal.         Judgment: Judgment normal.           /60   Pulse 71   Temp 98.1 °F (36.7 °C) (Oral)   Wt 161 lb (73 kg)   SpO2 98%   BMI 27.64 kg/m²

## 2022-05-02 RX ORDER — ROSUVASTATIN CALCIUM 5 MG/1
TABLET, COATED ORAL
Qty: 30 TABLET | Refills: 5 | Status: SHIPPED | OUTPATIENT
Start: 2022-05-02

## 2023-05-02 ENCOUNTER — OFFICE VISIT (OUTPATIENT)
Dept: FAMILY MEDICINE CLINIC | Age: 53
End: 2023-05-02
Payer: COMMERCIAL

## 2023-05-02 VITALS
OXYGEN SATURATION: 99 % | DIASTOLIC BLOOD PRESSURE: 70 MMHG | HEART RATE: 87 BPM | HEIGHT: 64 IN | BODY MASS INDEX: 28.85 KG/M2 | SYSTOLIC BLOOD PRESSURE: 130 MMHG | WEIGHT: 169 LBS

## 2023-05-02 DIAGNOSIS — M18.0 ARTHRITIS OF CARPOMETACARPAL (CMC) JOINT OF BOTH THUMBS: ICD-10-CM

## 2023-05-02 DIAGNOSIS — R22.32 LOCALIZED SWELLING OF FINGER OF LEFT HAND: Primary | ICD-10-CM

## 2023-05-02 DIAGNOSIS — F51.01 PRIMARY INSOMNIA: ICD-10-CM

## 2023-05-02 DIAGNOSIS — E78.2 MIXED HYPERLIPIDEMIA: ICD-10-CM

## 2023-05-02 DIAGNOSIS — R22.32 LOCALIZED SWELLING OF FINGER OF LEFT HAND: ICD-10-CM

## 2023-05-02 PROCEDURE — 99214 OFFICE O/P EST MOD 30 MIN: CPT | Performed by: STUDENT IN AN ORGANIZED HEALTH CARE EDUCATION/TRAINING PROGRAM

## 2023-05-02 PROCEDURE — 3075F SYST BP GE 130 - 139MM HG: CPT | Performed by: STUDENT IN AN ORGANIZED HEALTH CARE EDUCATION/TRAINING PROGRAM

## 2023-05-02 PROCEDURE — 3078F DIAST BP <80 MM HG: CPT | Performed by: STUDENT IN AN ORGANIZED HEALTH CARE EDUCATION/TRAINING PROGRAM

## 2023-05-02 RX ORDER — NAPROXEN 500 MG/1
500 TABLET ORAL 2 TIMES DAILY WITH MEALS
Qty: 60 TABLET | Refills: 3 | Status: SHIPPED | OUTPATIENT
Start: 2023-05-02

## 2023-05-02 ASSESSMENT — PATIENT HEALTH QUESTIONNAIRE - PHQ9
SUM OF ALL RESPONSES TO PHQ QUESTIONS 1-9: 5
3. TROUBLE FALLING OR STAYING ASLEEP: 3
7. TROUBLE CONCENTRATING ON THINGS, SUCH AS READING THE NEWSPAPER OR WATCHING TELEVISION: 0
SUM OF ALL RESPONSES TO PHQ QUESTIONS 1-9: 5
2. FEELING DOWN, DEPRESSED OR HOPELESS: 0
1. LITTLE INTEREST OR PLEASURE IN DOING THINGS: 0
8. MOVING OR SPEAKING SO SLOWLY THAT OTHER PEOPLE COULD HAVE NOTICED. OR THE OPPOSITE, BEING SO FIGETY OR RESTLESS THAT YOU HAVE BEEN MOVING AROUND A LOT MORE THAN USUAL: 0
SUM OF ALL RESPONSES TO PHQ QUESTIONS 1-9: 5
SUM OF ALL RESPONSES TO PHQ QUESTIONS 1-9: 5
6. FEELING BAD ABOUT YOURSELF - OR THAT YOU ARE A FAILURE OR HAVE LET YOURSELF OR YOUR FAMILY DOWN: 1
4. FEELING TIRED OR HAVING LITTLE ENERGY: 1
SUM OF ALL RESPONSES TO PHQ9 QUESTIONS 1 & 2: 0
9. THOUGHTS THAT YOU WOULD BE BETTER OFF DEAD, OR OF HURTING YOURSELF: 0

## 2023-05-02 NOTE — PATIENT INSTRUCTIONS
Please set up an appointment with Dr. Olayinka Lawrence for your annual wellness exam. Arnaldo Osorio are overdue for blood work and health maintenance, etc

## 2023-05-02 NOTE — PROGRESS NOTES
regular rhythm. Pulses: Normal pulses. Heart sounds: Normal heart sounds. Pulmonary:      Effort: Pulmonary effort is normal.      Breath sounds: Normal breath sounds. Musculoskeletal:         General: Tenderness present. Normal range of motion. Comments: Full thumb ROM including extension. Left MCP is edematous   Neurological:      General: No focal deficit present. Mental Status: She is alert and oriented to person, place, and time. Mental status is at baseline. Sensory: No sensory deficit. Motor: No weakness. Comments: Neg tinels, +CMC TTP (not new). Pt has new and persistent MCP TTP and swelling without underlying erythema with induration   Psychiatric:         Mood and Affect: Mood normal.         Behavior: Behavior normal.         Thought Content: Thought content normal.         Judgment: Judgment normal.       Cindy Dowell,     This dictation was generated by voice recognition computer software. Although all attempts are made to edit the dictation for accuracy, there may be errors in the transcription that are not intended.

## 2023-05-03 LAB
ALBUMIN SERPL-MCNC: 4.7 G/DL (ref 3.4–5)
ALBUMIN/GLOB SERPL: 1.8 {RATIO} (ref 1.1–2.2)
ALP SERPL-CCNC: 54 U/L (ref 40–129)
ALT SERPL-CCNC: 21 U/L (ref 10–40)
ANION GAP SERPL CALCULATED.3IONS-SCNC: 8 MMOL/L (ref 3–16)
AST SERPL-CCNC: 18 U/L (ref 15–37)
BASOPHILS # BLD: 0.1 K/UL (ref 0–0.2)
BASOPHILS NFR BLD: 0.9 %
BILIRUB SERPL-MCNC: 0.3 MG/DL (ref 0–1)
BUN SERPL-MCNC: 14 MG/DL (ref 7–20)
CALCIUM SERPL-MCNC: 9.6 MG/DL (ref 8.3–10.6)
CHLORIDE SERPL-SCNC: 102 MMOL/L (ref 99–110)
CHOLEST SERPL-MCNC: 367 MG/DL (ref 0–199)
CO2 SERPL-SCNC: 28 MMOL/L (ref 21–32)
CREAT SERPL-MCNC: 0.6 MG/DL (ref 0.6–1.1)
DEPRECATED RDW RBC AUTO: 13 % (ref 12.4–15.4)
EOSINOPHIL # BLD: 0 K/UL (ref 0–0.6)
EOSINOPHIL NFR BLD: 0.5 %
GFR SERPLBLD CREATININE-BSD FMLA CKD-EPI: >60 ML/MIN/{1.73_M2}
GLUCOSE SERPL-MCNC: 87 MG/DL (ref 70–99)
HCT VFR BLD AUTO: 39.9 % (ref 36–48)
HDLC SERPL-MCNC: 80 MG/DL (ref 40–60)
HGB BLD-MCNC: 13.3 G/DL (ref 12–16)
LDLC SERPL CALC-MCNC: 255 MG/DL
LYMPHOCYTES # BLD: 1.8 K/UL (ref 1–5.1)
LYMPHOCYTES NFR BLD: 27 %
MCH RBC QN AUTO: 28.1 PG (ref 26–34)
MCHC RBC AUTO-ENTMCNC: 33.3 G/DL (ref 31–36)
MCV RBC AUTO: 84.5 FL (ref 80–100)
MONOCYTES # BLD: 0.6 K/UL (ref 0–1.3)
MONOCYTES NFR BLD: 9.3 %
NEUTROPHILS # BLD: 4.2 K/UL (ref 1.7–7.7)
NEUTROPHILS NFR BLD: 62.3 %
PLATELET # BLD AUTO: 220 K/UL (ref 135–450)
PMV BLD AUTO: 8.9 FL (ref 5–10.5)
POTASSIUM SERPL-SCNC: 4.5 MMOL/L (ref 3.5–5.1)
PROT SERPL-MCNC: 7.3 G/DL (ref 6.4–8.2)
RBC # BLD AUTO: 4.72 M/UL (ref 4–5.2)
SODIUM SERPL-SCNC: 138 MMOL/L (ref 136–145)
TRIGL SERPL-MCNC: 159 MG/DL (ref 0–150)
TSH SERPL DL<=0.005 MIU/L-ACNC: 1.34 UIU/ML (ref 0.27–4.2)
URATE SERPL-MCNC: 3 MG/DL (ref 2.6–6)
VLDLC SERPL CALC-MCNC: 32 MG/DL
WBC # BLD AUTO: 6.7 K/UL (ref 4–11)

## 2023-06-05 ENCOUNTER — OFFICE VISIT (OUTPATIENT)
Dept: FAMILY MEDICINE CLINIC | Age: 53
End: 2023-06-05
Payer: COMMERCIAL

## 2023-06-05 VITALS
WEIGHT: 168 LBS | HEIGHT: 64 IN | BODY MASS INDEX: 28.68 KG/M2 | DIASTOLIC BLOOD PRESSURE: 70 MMHG | HEART RATE: 88 BPM | OXYGEN SATURATION: 98 % | SYSTOLIC BLOOD PRESSURE: 110 MMHG

## 2023-06-05 DIAGNOSIS — R22.32 MASS OF SKIN OF LEFT THUMB: ICD-10-CM

## 2023-06-05 DIAGNOSIS — Z00.00 ANNUAL PHYSICAL EXAM: Primary | ICD-10-CM

## 2023-06-05 DIAGNOSIS — N91.1 SECONDARY AMENORRHEA: ICD-10-CM

## 2023-06-05 DIAGNOSIS — E78.2 MIXED HYPERLIPIDEMIA: ICD-10-CM

## 2023-06-05 PROCEDURE — 3078F DIAST BP <80 MM HG: CPT | Performed by: FAMILY MEDICINE

## 2023-06-05 PROCEDURE — 99396 PREV VISIT EST AGE 40-64: CPT | Performed by: FAMILY MEDICINE

## 2023-06-05 PROCEDURE — 3074F SYST BP LT 130 MM HG: CPT | Performed by: FAMILY MEDICINE

## 2023-06-05 RX ORDER — ROSUVASTATIN CALCIUM 5 MG/1
5 TABLET, COATED ORAL NIGHTLY
Qty: 30 TABLET | Refills: 5 | Status: SHIPPED | OUTPATIENT
Start: 2023-06-05

## 2023-06-08 ENCOUNTER — COMMUNITY OUTREACH (OUTPATIENT)
Dept: FAMILY MEDICINE CLINIC | Age: 53
End: 2023-06-08

## 2023-06-14 PROBLEM — L91.0 HYPERTROPHIC SCAR OF SKIN: Status: ACTIVE | Noted: 2023-06-14

## 2023-06-19 DIAGNOSIS — R22.32 MASS OF SKIN OF LEFT THUMB: Primary | ICD-10-CM

## 2023-06-19 RX ORDER — CEPHALEXIN 500 MG/1
500 CAPSULE ORAL 2 TIMES DAILY
Qty: 14 CAPSULE | Refills: 0 | Status: SHIPPED | OUTPATIENT
Start: 2023-06-19 | End: 2023-06-26

## 2023-11-08 DIAGNOSIS — E78.2 MIXED HYPERLIPIDEMIA: ICD-10-CM

## 2023-11-08 RX ORDER — ROSUVASTATIN CALCIUM 5 MG/1
5 TABLET, COATED ORAL
Qty: 90 TABLET | Refills: 3 | Status: SHIPPED | OUTPATIENT
Start: 2023-11-08

## 2024-06-17 ENCOUNTER — OFFICE VISIT (OUTPATIENT)
Dept: FAMILY MEDICINE CLINIC | Age: 54
End: 2024-06-17
Payer: COMMERCIAL

## 2024-06-17 VITALS
WEIGHT: 162 LBS | SYSTOLIC BLOOD PRESSURE: 120 MMHG | BODY MASS INDEX: 27.66 KG/M2 | DIASTOLIC BLOOD PRESSURE: 70 MMHG | HEART RATE: 65 BPM | OXYGEN SATURATION: 98 % | HEIGHT: 64 IN

## 2024-06-17 DIAGNOSIS — E78.2 MIXED HYPERLIPIDEMIA: ICD-10-CM

## 2024-06-17 DIAGNOSIS — Z00.00 ANNUAL PHYSICAL EXAM: Primary | ICD-10-CM

## 2024-06-17 DIAGNOSIS — Z12.11 COLON CANCER SCREENING: ICD-10-CM

## 2024-06-17 DIAGNOSIS — Z00.00 ANNUAL PHYSICAL EXAM: ICD-10-CM

## 2024-06-17 DIAGNOSIS — R23.2 HOT FLASHES: ICD-10-CM

## 2024-06-17 PROCEDURE — 99396 PREV VISIT EST AGE 40-64: CPT | Performed by: FAMILY MEDICINE

## 2024-06-17 PROCEDURE — 3074F SYST BP LT 130 MM HG: CPT | Performed by: FAMILY MEDICINE

## 2024-06-17 PROCEDURE — 3078F DIAST BP <80 MM HG: CPT | Performed by: FAMILY MEDICINE

## 2024-06-17 ASSESSMENT — PATIENT HEALTH QUESTIONNAIRE - PHQ9
SUM OF ALL RESPONSES TO PHQ9 QUESTIONS 1 & 2: 0
SUM OF ALL RESPONSES TO PHQ QUESTIONS 1-9: 0
SUM OF ALL RESPONSES TO PHQ QUESTIONS 1-9: 0
1. LITTLE INTEREST OR PLEASURE IN DOING THINGS: NOT AT ALL
SUM OF ALL RESPONSES TO PHQ QUESTIONS 1-9: 0
2. FEELING DOWN, DEPRESSED OR HOPELESS: NOT AT ALL
SUM OF ALL RESPONSES TO PHQ QUESTIONS 1-9: 0

## 2024-06-17 NOTE — PROGRESS NOTES
Mood and Affect: Mood normal.         Behavior: Behavior normal.         Thought Content: Thought content normal.         Judgment: Judgment normal.           /70   Pulse 65   Ht 1.626 m (5' 4\")   Wt 73.5 kg (162 lb)   SpO2 98%   BMI 27.81 kg/m²

## 2024-06-18 LAB
ALBUMIN SERPL-MCNC: 4.4 G/DL (ref 3.4–5)
ALBUMIN/GLOB SERPL: 2.2 {RATIO} (ref 1.1–2.2)
ALP SERPL-CCNC: 45 U/L (ref 40–129)
ALT SERPL-CCNC: 15 U/L (ref 10–40)
ANION GAP SERPL CALCULATED.3IONS-SCNC: 7 MMOL/L (ref 3–16)
AST SERPL-CCNC: 14 U/L (ref 15–37)
BILIRUB SERPL-MCNC: 0.5 MG/DL (ref 0–1)
BUN SERPL-MCNC: 13 MG/DL (ref 7–20)
CALCIUM SERPL-MCNC: 9.3 MG/DL (ref 8.3–10.6)
CHLORIDE SERPL-SCNC: 103 MMOL/L (ref 99–110)
CHOLEST SERPL-MCNC: 248 MG/DL (ref 0–199)
CO2 SERPL-SCNC: 25 MMOL/L (ref 21–32)
CREAT SERPL-MCNC: 0.6 MG/DL (ref 0.6–1.1)
GFR SERPLBLD CREATININE-BSD FMLA CKD-EPI: >90 ML/MIN/{1.73_M2}
GLUCOSE SERPL-MCNC: 83 MG/DL (ref 70–99)
HDLC SERPL-MCNC: 72 MG/DL (ref 40–60)
LDLC SERPL CALC-MCNC: 150 MG/DL
POTASSIUM SERPL-SCNC: 3.7 MMOL/L (ref 3.5–5.1)
PROT SERPL-MCNC: 6.4 G/DL (ref 6.4–8.2)
SODIUM SERPL-SCNC: 135 MMOL/L (ref 136–145)
TRIGL SERPL-MCNC: 132 MG/DL (ref 0–150)
VLDLC SERPL CALC-MCNC: 26 MG/DL

## 2024-12-25 LAB — NONINV COLON CA DNA+OCC BLD SCRN STL QL: NEGATIVE

## 2025-07-14 ENCOUNTER — OFFICE VISIT (OUTPATIENT)
Dept: FAMILY MEDICINE CLINIC | Age: 55
End: 2025-07-14
Payer: COMMERCIAL

## 2025-07-14 VITALS
OXYGEN SATURATION: 99 % | BODY MASS INDEX: 26.63 KG/M2 | WEIGHT: 156 LBS | HEIGHT: 64 IN | HEART RATE: 81 BPM | SYSTOLIC BLOOD PRESSURE: 110 MMHG | DIASTOLIC BLOOD PRESSURE: 80 MMHG

## 2025-07-14 DIAGNOSIS — M25.60 STIFFNESS IN JOINT: ICD-10-CM

## 2025-07-14 DIAGNOSIS — Z78.0 MENOPAUSE: ICD-10-CM

## 2025-07-14 DIAGNOSIS — Z82.62 FAMILY HISTORY OF OSTEOPOROSIS: ICD-10-CM

## 2025-07-14 DIAGNOSIS — Z23 NEED FOR DIPHTHERIA-TETANUS-PERTUSSIS (TDAP) VACCINE: ICD-10-CM

## 2025-07-14 DIAGNOSIS — Z00.00 ANNUAL PHYSICAL EXAM: Primary | ICD-10-CM

## 2025-07-14 DIAGNOSIS — E78.2 MIXED HYPERLIPIDEMIA: ICD-10-CM

## 2025-07-14 DIAGNOSIS — H93.11 TINNITUS OF RIGHT EAR: ICD-10-CM

## 2025-07-14 LAB
ALBUMIN SERPL-MCNC: 4.4 G/DL (ref 3.4–5)
ALBUMIN/GLOB SERPL: 1.9 {RATIO} (ref 1.1–2.2)
ALP SERPL-CCNC: 53 U/L (ref 40–129)
ALT SERPL-CCNC: 23 U/L (ref 10–40)
ANION GAP SERPL CALCULATED.3IONS-SCNC: 11 MMOL/L (ref 3–16)
AST SERPL-CCNC: 18 U/L (ref 15–37)
BASOPHILS # BLD: 0.1 K/UL (ref 0–0.2)
BASOPHILS NFR BLD: 1 %
BILIRUB SERPL-MCNC: 0.5 MG/DL (ref 0–1)
BUN SERPL-MCNC: 16 MG/DL (ref 7–20)
CALCIUM SERPL-MCNC: 9.6 MG/DL (ref 8.3–10.6)
CHLORIDE SERPL-SCNC: 104 MMOL/L (ref 99–110)
CHOLEST SERPL-MCNC: 400 MG/DL (ref 0–199)
CO2 SERPL-SCNC: 25 MMOL/L (ref 21–32)
CREAT SERPL-MCNC: 0.6 MG/DL (ref 0.6–1.1)
CRP SERPL-MCNC: <3 MG/L (ref 0–5.1)
DEPRECATED RDW RBC AUTO: 13 % (ref 12.4–15.4)
EOSINOPHIL # BLD: 0 K/UL (ref 0–0.6)
EOSINOPHIL NFR BLD: 0.6 %
ERYTHROCYTE [SEDIMENTATION RATE] IN BLOOD BY WESTERGREN METHOD: 21 MM/HR (ref 0–30)
GFR SERPLBLD CREATININE-BSD FMLA CKD-EPI: >90 ML/MIN/{1.73_M2}
GLUCOSE SERPL-MCNC: 88 MG/DL (ref 70–99)
HCT VFR BLD AUTO: 39.6 % (ref 36–48)
HDLC SERPL-MCNC: 79 MG/DL (ref 40–60)
HGB BLD-MCNC: 13.5 G/DL (ref 12–16)
LDLC SERPL CALC-MCNC: 295 MG/DL
LYMPHOCYTES # BLD: 2.2 K/UL (ref 1–5.1)
LYMPHOCYTES NFR BLD: 41.1 %
MCH RBC QN AUTO: 28.8 PG (ref 26–34)
MCHC RBC AUTO-ENTMCNC: 34.1 G/DL (ref 31–36)
MCV RBC AUTO: 84.3 FL (ref 80–100)
MONOCYTES # BLD: 0.3 K/UL (ref 0–1.3)
MONOCYTES NFR BLD: 5.5 %
NEUTROPHILS # BLD: 2.8 K/UL (ref 1.7–7.7)
NEUTROPHILS NFR BLD: 51.8 %
PLATELET # BLD AUTO: 227 K/UL (ref 135–450)
PMV BLD AUTO: 8.7 FL (ref 5–10.5)
POTASSIUM SERPL-SCNC: 4.3 MMOL/L (ref 3.5–5.1)
PROT SERPL-MCNC: 6.7 G/DL (ref 6.4–8.2)
RBC # BLD AUTO: 4.7 M/UL (ref 4–5.2)
RHEUMATOID FACT SER IA-ACNC: <10 IU/ML
SODIUM SERPL-SCNC: 140 MMOL/L (ref 136–145)
TRIGL SERPL-MCNC: 132 MG/DL (ref 0–150)
VLDLC SERPL CALC-MCNC: 26 MG/DL
WBC # BLD AUTO: 5.4 K/UL (ref 4–11)

## 2025-07-14 PROCEDURE — 90471 IMMUNIZATION ADMIN: CPT | Performed by: FAMILY MEDICINE

## 2025-07-14 PROCEDURE — 90715 TDAP VACCINE 7 YRS/> IM: CPT | Performed by: FAMILY MEDICINE

## 2025-07-14 PROCEDURE — 99396 PREV VISIT EST AGE 40-64: CPT | Performed by: FAMILY MEDICINE

## 2025-07-14 PROCEDURE — 3074F SYST BP LT 130 MM HG: CPT | Performed by: FAMILY MEDICINE

## 2025-07-14 PROCEDURE — 3079F DIAST BP 80-89 MM HG: CPT | Performed by: FAMILY MEDICINE

## 2025-07-14 RX ORDER — FLUTICASONE PROPIONATE 50 MCG
2 SPRAY, SUSPENSION (ML) NASAL DAILY
Qty: 16 G | Refills: 5 | Status: SHIPPED | OUTPATIENT
Start: 2025-07-14

## 2025-07-14 RX ORDER — ROSUVASTATIN CALCIUM 5 MG/1
5 TABLET, COATED ORAL DAILY
Qty: 90 TABLET | Refills: 3 | Status: SHIPPED | OUTPATIENT
Start: 2025-07-14 | End: 2025-07-15

## 2025-07-14 SDOH — ECONOMIC STABILITY: FOOD INSECURITY: WITHIN THE PAST 12 MONTHS, THE FOOD YOU BOUGHT JUST DIDN'T LAST AND YOU DIDN'T HAVE MONEY TO GET MORE.: NEVER TRUE

## 2025-07-14 SDOH — ECONOMIC STABILITY: FOOD INSECURITY: WITHIN THE PAST 12 MONTHS, YOU WORRIED THAT YOUR FOOD WOULD RUN OUT BEFORE YOU GOT MONEY TO BUY MORE.: NEVER TRUE

## 2025-07-14 ASSESSMENT — PATIENT HEALTH QUESTIONNAIRE - PHQ9
2. FEELING DOWN, DEPRESSED OR HOPELESS: NOT AT ALL
SUM OF ALL RESPONSES TO PHQ QUESTIONS 1-9: 0
1. LITTLE INTEREST OR PLEASURE IN DOING THINGS: NOT AT ALL

## 2025-07-14 NOTE — PROGRESS NOTES
Assessment/Plan:    Ling \"Will\" was seen today for annual exam.    Diagnoses and all orders for this visit:    Annual physical exam    Family history of osteoporosis  -     DEXA BONE DENSITY AXIAL SKELETON; Future    Menopause  -     DEXA BONE DENSITY AXIAL SKELETON; Future    Stiffness in joint  -     RHEUMATOID FACTOR; Future  -     MICAH Reflex to Antibody Cascade; Future  -     Sedimentation Rate; Future  -     C-Reactive Protein; Future  -     CBC with Auto Differential; Future    Mixed hyperlipidemia  -     Lipid Panel; Future  -     Comprehensive Metabolic Panel; Future  -     rosuvastatin (CRESTOR) 5 MG tablet; Take 1 tablet by mouth daily    Need for diphtheria-tetanus-pertussis (Tdap) vaccine  -     Tdap, BOOSTRIX, (age 10 yrs+), IM    Tinnitus of right ear  -     fluticasone (FLONASE) 50 MCG/ACT nasal spray; 2 sprays by Each Nostril route daily   ENT referral if flonase not effective.       Assessment & Plan  1. Hot flashes.  - Common during menopause. No specific treatment discussed.    2. Bone density screening.  - Ordered due to family history of osteoporosis and osteoarthritis. Patient will confirm insurance coverage or pay out-of-pocket.    3. Cholesterol management.  - Blood work ordered to monitor cholesterol levels. Medication refill sent to pharmacy. Advised to wait for blood work results before picking up medication.    4. Tinnitus.  - Intermittent popping sounds without pain or dizziness. Prescribed Flonase nasal spray, 2 sprays per nostril daily for 2 weeks. If effective, continue use; if not, referral to ENT specialist.    5. Health maintenance.  - Routine blood work for annual check-up. Additional tests for potential autoimmune processes. Comprehensive panel including electrolytes, glucose, kidney function, liver function, WBC, RBC, and platelets ordered. Tdap vaccine due today. Encouraged to consider shingles vaccine before age 70.    Patient Instructions   Please call or message for an

## 2025-07-15 LAB — ANA SER QL IA: NEGATIVE
